# Patient Record
(demographics unavailable — no encounter records)

---

## 2024-10-17 NOTE — PHYSICAL EXAM
[Normal] : Gait: normal [Walden's Sign] : negative Walden's sign [Pronator Drift] : negative pronator drift [SLR] : negative straight leg raise [de-identified] : 5 out of 5 motor strength, sensation is intact and symmetrical full range of motion flexion extension and rotation, no palpatory tenderness full range of motion of hips knees shoulders and elbows (all four extremities), no atrophy, negative straight leg raise, no pathological reflexes, no swelling, normal ambulation, no apparent distress skin is intact, no palpable lymph nodes, no upper or lower extremity instability, alert and oriented x3 and normal mood. Normal finger-to nose test.  No upper motor neuron findings. Incision healed. [de-identified] : I reviewed, interpreted and clinically correlated the following outside imaging studies,  MRI Cervical([facility]) 10/1/24 Extensive postoperative changes as above disc degenerative disease  C3-4 mild-moderate stenosis. Moderate bilateral foraminal stenosis C6-7 Punctate focus of signal hyperintensity in the posterior aspect of the cervical spinal cord just right of midline, which is nonspecific but could reflect a small area of chronic myelomalacia. moderate right foraminal stenosis   MRI Cervical([facility]) 6/8/22 MRI Thoracic ([facility]) 6/8/22 Multilevel degenerative chages most pronounced at C6-7 where there is moderate stenosis Multiple signal void within the posterior epidural space at T3-5 likely representing air secondary to recent procedure  X-ray Cervical Spine 6/9/22 ([facility]) disc degenerative disease  Slight linear lucencies along the posterior paraspinal musculature just posterior to the C2 and C3 spinus processes, compatible with known soft tissue emphysema seen on the previous CT   CT CERVICAL SPINE WO CONTRAST  6/19/24  ([facility] [place])   CLINICAL HISTORY: 64 years Male, left paracervical pain, radiating to left shoulder; Cervicalgia  TECHNIQUE:  CT of the cervical spine was performed without contrast with axial multislice multidetector technique. Sagittal, coronal and axial reformatted images were then obtained. One or more of the following dose reduction techniques were used: automated exposure control, adjustment of the mA and/or kV according to patient size, use of iterative reconstruction technique.  COMPARISON:  CT of the cervical spine from July 7, 2023  FINDINGS:  The patient is again noted to be status post instrumented posterior spinal fusion from C4 through T2 with bilateral pedicle screws and interconnecting rods at C4, C5, C6, T1, and T2. Posterior decompression is noted from C3 C5 C6 through T1-T2. Hardware appears intact. Multilevel osseous fusion is noted across bilateral facet joints extending from C4 through T2 bilaterally.  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 Vertebral body heights are preserved without fracture. There is no spondylolisthesis. There is no osseous lesion. Degenerative changes are noted in the interval. The skull base articulations are otherwise intact. There is no prevertebral soft tissue swelling identified.  C1/2: Degenerative changes.  C2/C3: Posterior disc ridge complex with mild spinal canal stenosis. Mild left foraminal stenosis. No right foraminal stenosis.  C3/C4: Posterior disc ridge complex in the central and left paracentral zone measuring 1.5 x 0.5 cm in transverse by anteroposterior dimension. Mild to moderate central/left paracentral canal stenosis. There is bilateral uncovertebral and facet degeneration, left greater than right with severe left and mild to moderate right foraminal stenosis. The degree of right foraminal stenosis is slightly increased when compared to prior study.  C4/C5: Posterior decompression is noted. Congenital block vertebra. No stenosis.  C5/C6: Posterior decompression is noted. Posterior disc ridge complex without spinal canal stenosis. There is mild right foraminal stenosis. No change.  C6/C7: Posterior decompression is noted. There is a bulky disc ridge complex extending from the upper C6 level to just below the level of the C6-7 disc space, similar to the prior. The spinal canal is well decompressed. There is bilateral uncovertebral degeneration with mild to moderate right and mild left foraminal stenosis, unchanged.  C7/T1: Posterior decompression is noted. No stenosis.    IMPRESSION:  Postoperative and degenerative cervical spondylotic changes as above.

## 2024-10-17 NOTE — ADDENDUM
[FreeTextEntry1] : This note was written by Yg Olea on 10/17/2024 acting as scribe for Dr. Kolton Bañuelos M.D.  I, Kolton Bañuelos MD, have read and attest that all the information, medical decision making and discharge instructions within are true and accurate.

## 2024-10-17 NOTE — DISCUSSION/SUMMARY
[de-identified] : S/P C4-T2 POST. Cervicalgia. Discussed all options. Referred to Dr. Zeke Bullock and Dr. Louise for neurological evaluation. He should see a neurologist before he goes to pain management. He has mostly neck pain and will follow-up with his primary spinal surgeon as well. All options discussed including rest, medicine, home exercise, acupuncture, Chiropractic care, Physical Therapy, Pain management, and last resort surgery. All questions were answered, all alternatives discussed, and the patient is in complete agreement with the treatment plan which the patient contributed to and discussed with me through the shared decision-making process. Follow-up appointment as instructed. Any issues and the patient will call or come in sooner.

## 2024-10-17 NOTE — HISTORY OF PRESENT ILLNESS
[Stable] : stable [de-identified] : 64 year old male presents for initial evaluation of chronic neck pain, which has worsened.  S/P PCF C4-T2 in 2022 with Dr. Wili Newman at [place]. He states that prior to his surgery, he underwent a series of YESSI x 3. He had extreme pain after the 3rd JAYDE, he was found to have epidural hematoma and underwent the surgery.  He states he has stiffness of the neck, with limited of ROM. He states his pain has worsened.  He also has numbness of the neck.  Denies radiation of pain down the arms.  Denies weakness of arms and legs. Does not take medications for the pain. Last participated with PT post-operatively. No recent PT or chiropractic care. Denies JAYDE.  PMHx: HTN, DM, CABG x 3, on plavix He is retired.  No fever, chills, sweats, nausea/vomiting. No bowel or bladder dysfunction, no recent weight loss or gain. No night pain. This history is in addition to the intake form that I personally reviewed.

## 2025-02-10 NOTE — DISCUSSION/SUMMARY
[FreeTextEntry1] : Mr. Mckay is a 65 yo man with PMH of s/p CABG, DM and HTN , s/p laminectomy 2 years ago.  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 advise to take cyclobenzaprine 2.5 mg at night and slowly increase to 5 then 7.5 mg at night. will refer to acupuncture and physical therapy. RTC 2 months. all questions answered. I spent the time noted on the day of this patient encounter preparing for, providing and documenting the above E/M service and counseling and educate patient on differential, workup, disease course, and treatment/management. Education was provided to the patient during this encounter. All questions and concerns were answered and addressed in detail. The patient verbalized understanding and agreed to plan. Patient was advised to continue to monitor for neurologic symptoms and to notify my office or go to the nearest emergency room if there are any changes. Any orders/referrals and communications were provided as well. side effects of the above medications were discussed in detail including but not limited to applicable black box warning and teratogenicity as appropriate.  Patient was advised to bring previous records to my office.

## 2025-02-10 NOTE — HISTORY OF PRESENT ILLNESS
Trauma Coordinator note:  Reviewed.   
[FreeTextEntry1] : He had neck pain for many years, had gone PT and JAYDE. Over time, it got worse. On the third injection, he had extreme pain over the neck. He was found to have bubble and blood clot epidural hematoma. He had subsequently neck surgery at [place] 2 years ago. After that, he has had pain on his neck. Stiffness and limited ROM. His pain has worsened. The whole area where the sx was done is totally numb. It is discomfort.  He also has arthritis.  No problem with urination, and bowel movement.  8 months ago, he had CABG due to 3 vessels blockade. He is on aspirin only now.  other PMH: DM, and HTN.  He had seen pain management but no more as they wanted to do more JAYDE.   He tried gabapentin, could not tolerate due to side effects with lightheadedness and constipation.  He also tried duloxetine. do not remember side effects.  Has not tried acupuncture.   he tried cyclobenzaprine 5 mg TID which helped but unable to tolerate side effects.

## 2025-02-10 NOTE — PHYSICAL EXAM
[Person] : oriented to person [Place] : oriented to place [Time] : oriented to time [Fluency] : fluency intact [Comprehension] : comprehension intact [Cranial Nerves Optic (II)] : visual acuity intact bilaterally,  visual fields full to confrontation, pupils equal round and reactive to light [Cranial Nerves Oculomotor (III)] : extraocular motion intact [Cranial Nerves Trigeminal (V)] : facial sensation intact symmetrically [Cranial Nerves Facial (VII)] : face symmetrical [Cranial Nerves Vestibulocochlear (VIII)] : hearing was intact bilaterally [Cranial Nerves Glossopharyngeal (IX)] : tongue and palate midline [Cranial Nerves Accessory (XI - Cranial And Spinal)] : head turning and shoulder shrug symmetric [Cranial Nerves Hypoglossal (XII)] : there was no tongue deviation with protrusion [Motor Tone] : muscle tone was normal in all four extremities [Motor Strength] : muscle strength was normal in all four extremities [Involuntary Movements] : no involuntary movements were seen [No Muscle Atrophy] : normal bulk in all four extremities [Sensation Tactile Decrease] : light touch was intact [Sensation Pain / Temperature Decrease] : pain and temperature was intact [Sensation Vibration Decrease] : vibration was intact [Proprioception] : proprioception was intact [0] : Brachioradialis left 0 [2+] : Patella left 2+ [1+] : Ankle jerk left 1+ [FreeTextEntry1] : surgical scar at midline back of the neck. limited ROM all direction flex/extension and lateral rotation. dysesthesia at the surgical site. [Romberg's Sign] : Romberg's sign was negtive [Plantar Reflex Right Only] : normal on the right [Plantar Reflex Left Only] : normal on the left